# Patient Record
Sex: MALE | Race: BLACK OR AFRICAN AMERICAN | Employment: FULL TIME
[De-identification: names, ages, dates, MRNs, and addresses within clinical notes are randomized per-mention and may not be internally consistent; named-entity substitution may affect disease eponyms.]

---

## 2022-12-15 ENCOUNTER — NURSE TRIAGE (OUTPATIENT)
Dept: OTHER | Facility: CLINIC | Age: 47
End: 2022-12-15

## 2022-12-15 NOTE — TELEPHONE ENCOUNTER
Location of patient: [address] [facility] [address] [facility] [address] osorio from [place] at [facility] with [employer]; Patient with Red Flag Complaint requesting to establish care with [facility]. Subjective: Caller states \"has dizziness and ringing in ears. \"     Current Symptoms: Friday when woke up and stood up was feeling dizzy. Laid back down and when got up wasn't as bad. Ears have been ringing. Ears have also been popping. Still dizzy at times. Onset: 1 week ago; gradual, intermittent    Associated Symptoms: NA    Pain Severity:    Temperature: denies     What has been tried:     LMP: NA Pregnant: NA    Recommended disposition: See PCP within 3 Days    Care advice provided, patient verbalizes understanding; denies any other questions or concerns; instructed to call back for any new or worsening symptoms. Patient/Caller agrees with recommended disposition; writer provided warm transfer to [place] [facility] for appointment scheduling    Attention Provider: Thank you for allowing me to participate in the care of your patient. The patient was connected to triage in response to information provided to the [facility]. Please do not respond through this encounter as the response is not directed to a shared pool.     Reason for Disposition   MILD dizziness (e.g., walking normally) and has NOT been evaluated by physician for this (Exception: dizziness caused by heat exposure, sudden standing, or poor fluid intake)    Protocols used: Dizziness-ADULT-[place]

## 2022-12-16 ENCOUNTER — OFFICE VISIT (OUTPATIENT)
Dept: INTERNAL MEDICINE CLINIC | Age: 47
End: 2022-12-16
Payer: COMMERCIAL

## 2022-12-16 VITALS
BODY MASS INDEX: 31.78 KG/M2 | TEMPERATURE: 98.7 F | OXYGEN SATURATION: 96 % | WEIGHT: 222 LBS | SYSTOLIC BLOOD PRESSURE: 119 MMHG | HEIGHT: 70 IN | RESPIRATION RATE: 20 BRPM | HEART RATE: 95 BPM | DIASTOLIC BLOOD PRESSURE: 77 MMHG

## 2022-12-16 DIAGNOSIS — R42 VERTIGO: Primary | ICD-10-CM

## 2022-12-16 PROCEDURE — 99202 OFFICE O/P NEW SF 15 MIN: CPT | Performed by: NURSE PRACTITIONER

## 2022-12-16 RX ORDER — MECLIZINE HYDROCHLORIDE 25 MG/1
25 TABLET ORAL
Qty: 30 TABLET | Refills: 0 | Status: SHIPPED | OUTPATIENT
Start: 2022-12-16 | End: 2022-12-26

## 2022-12-16 NOTE — PROGRESS NOTES
Zeinab Plaza (: 1975) is a 49 y.o. male is here for evaluation of the following chief complaint(s): Dizziness (Ears popping - balance feels off)        Subjective/Objective:   Digna Ledesma was seen today for Dizziness (Ears popping - balance feels off)  PT states has had ears popping of an on for a year. Pt reports dizziness with room spinning for a week and increase ear fullness. Pt denies pain fever drainage from ears. PT denies cough runny nose or congestion. Review of Systems   Constitutional: Negative. HENT:  Positive for tinnitus. Ears popping. Eyes: Negative. Respiratory: Negative. Cardiovascular: Negative. Gastrointestinal: Negative. Genitourinary: Negative. Musculoskeletal: Negative. Skin: Negative. Neurological:  Positive for dizziness. Negative for tingling, tremors, sensory change, speech change, focal weakness, seizures, loss of consciousness, weakness and headaches. Endo/Heme/Allergies: Negative. Psychiatric/Behavioral: Negative. Physical Exam  Vitals reviewed. Constitutional:       General: He is not in acute distress. Appearance: Normal appearance. He is not ill-appearing. HENT:      Head: Normocephalic and atraumatic. Right Ear: Ear canal and external ear normal. No drainage, swelling or tenderness. No middle ear effusion. No mastoid tenderness. Left Ear: Ear canal and external ear normal. There is impacted cerumen. Ears:      Comments: Fluid behind ears drum. Nose: Nose normal.      Right Sinus: No maxillary sinus tenderness or frontal sinus tenderness. Left Sinus: No maxillary sinus tenderness or frontal sinus tenderness. Mouth/Throat:      Lips: Pink. Mouth: Mucous membranes are moist.      Pharynx: Oropharynx is clear. Eyes:      General: Lids are normal.      Extraocular Movements:      Right eye: No nystagmus. Left eye: No nystagmus.       Conjunctiva/sclera: Conjunctivae normal.   Neck: Trachea: Trachea and phonation normal.   Cardiovascular:      Rate and Rhythm: Normal rate and regular rhythm. Pulses: Normal pulses. Radial pulses are 2+ on the right side. Heart sounds: Normal heart sounds, S1 normal and S2 normal.   Pulmonary:      Effort: Pulmonary effort is normal.      Breath sounds: Normal breath sounds and air entry. Abdominal:      Palpations: Abdomen is soft. Musculoskeletal:         General: Normal range of motion. Cervical back: Full passive range of motion without pain and normal range of motion. No spinous process tenderness or muscular tenderness. Right lower leg: No edema. Left lower leg: No edema. Lymphadenopathy:      Cervical: No cervical adenopathy. Skin:     General: Skin is warm and dry. Capillary Refill: Capillary refill takes less than 2 seconds. Neurological:      Mental Status: He is alert. Sensory: Sensation is intact. Motor: Motor function is intact. Coordination: Coordination is intact. Gait: Gait is intact. Deep Tendon Reflexes:      Reflex Scores:       Bicep reflexes are 2+ on the right side and 2+ on the left side. Patellar reflexes are 3+ on the right side and 2+ on the left side. Comments: No neurological abnormality noted. Psychiatric:         Mood and Affect: Mood normal.         Behavior: Behavior normal.         Thought Content: Thought content normal.         Judgment: Judgment normal.        /77 (BP 1 Location: Right arm, BP Patient Position: Sitting, BP Cuff Size: Large adult)   Pulse 95   Temp 98.7 °F (37.1 °C) (Temporal)   Resp 20   Ht 5' 10\" (1.778 m)   Wt 222 lb (100.7 kg)   SpO2 96%   BMI 31.85 kg/m²      No visits with results within 6 Month(s) from this visit.    Latest known visit with results is:   Office Visit on 05/06/2011   Component Date Value Ref Range Status    Testosterone 05/06/2011 442  249 - 836 ng/dL Final    Free testosterone (Direct) 05/06/2011 12.7  8.7 - 25.1 pg/mL Final    Comment: Performed At: [address]  [id number]                           Cheryle Garret MD                           [phone]         No past surgical history on file. Past Medical History:   Diagnosis Date    Pure hypercholesterolemia     Rectal bleed         Current Outpatient Medications   Medication Instructions    meclizine (ANTIVERT) 25 mg, Oral, 3 TIMES DAILY AS NEEDED        Assessment/Plan:     The above diagnosis is a new problem. We discussed expected course, resolution, and complications of diagnosis in detail. I advised him to call back or return to office if symptoms worsen/change/persist.        ICD-10-CM ICD-9-CM    1. Vertigo  R42 780.4 REFERRAL TO ENT-OTOLARYNGOLOGY         Return if symptoms worsen or fail to improve. An electronic signature was used to authenticate this note.   -- Rosi Hooper NP

## 2022-12-16 NOTE — PATIENT INSTRUCTIONS
Please take medication prescribed and if no better make appt with the Ear Nose and Throat specialist.     It was a pleasure taking care of you today.      María Crawford NP

## 2023-08-31 ENCOUNTER — CLINICAL DOCUMENTATION (OUTPATIENT)
Facility: CLINIC | Age: 48
End: 2023-08-31

## 2023-08-31 SDOH — ECONOMIC STABILITY: FOOD INSECURITY: WITHIN THE PAST 12 MONTHS, THE FOOD YOU BOUGHT JUST DIDN'T LAST AND YOU DIDN'T HAVE MONEY TO GET MORE.: NEVER TRUE

## 2023-08-31 SDOH — ECONOMIC STABILITY: HOUSING INSECURITY
IN THE LAST 12 MONTHS, WAS THERE A TIME WHEN YOU DID NOT HAVE A STEADY PLACE TO SLEEP OR SLEPT IN A SHELTER (INCLUDING NOW)?: NO

## 2023-08-31 SDOH — ECONOMIC STABILITY: INCOME INSECURITY: HOW HARD IS IT FOR YOU TO PAY FOR THE VERY BASICS LIKE FOOD, HOUSING, MEDICAL CARE, AND HEATING?: NOT HARD AT ALL

## 2023-08-31 SDOH — ECONOMIC STABILITY: FOOD INSECURITY: WITHIN THE PAST 12 MONTHS, YOU WORRIED THAT YOUR FOOD WOULD RUN OUT BEFORE YOU GOT MONEY TO BUY MORE.: NEVER TRUE

## 2023-08-31 SDOH — ECONOMIC STABILITY: TRANSPORTATION INSECURITY
IN THE PAST 12 MONTHS, HAS LACK OF TRANSPORTATION KEPT YOU FROM MEETINGS, WORK, OR FROM GETTING THINGS NEEDED FOR DAILY LIVING?: NO

## 2023-08-31 NOTE — PROGRESS NOTES
Pt sister, Tobias Crews called to make an appt, saying that he is 3 hours away and needs it for tomorrow because he needs to e evaluated because he has not been here for almost a year. I told her I was sorry but tomorrow was booked and that Dr Kira Castillo was out on fridays, could I look at another day for her to schedule him. She got upset saying that he needs an appt either today or tomorrow. I told her I could not do tomorrow and apologized. She asked if there was anything today, I told her at the time I had a 1 or 1:30. She started yelling that he was 3 hours away so he would not be able to get here at those times. I apologized once again and said that there wasn't anything I could do if there is no availability. She said \"Well if I have to, I will give him my appt today. \" And hung up the phone

## 2023-09-01 ENCOUNTER — OFFICE VISIT (OUTPATIENT)
Facility: CLINIC | Age: 48
End: 2023-09-01
Payer: COMMERCIAL

## 2023-09-01 VITALS
HEART RATE: 89 BPM | WEIGHT: 217 LBS | RESPIRATION RATE: 16 BRPM | TEMPERATURE: 98.6 F | BODY MASS INDEX: 32.14 KG/M2 | OXYGEN SATURATION: 94 % | DIASTOLIC BLOOD PRESSURE: 89 MMHG | HEIGHT: 69 IN | SYSTOLIC BLOOD PRESSURE: 134 MMHG

## 2023-09-01 DIAGNOSIS — D22.9 ATYPICAL MOLE: ICD-10-CM

## 2023-09-01 DIAGNOSIS — K21.9 GASTROESOPHAGEAL REFLUX DISEASE, UNSPECIFIED WHETHER ESOPHAGITIS PRESENT: Primary | ICD-10-CM

## 2023-09-01 PROCEDURE — 99213 OFFICE O/P EST LOW 20 MIN: CPT | Performed by: NURSE PRACTITIONER

## 2023-09-01 RX ORDER — OMEPRAZOLE 40 MG/1
40 CAPSULE, DELAYED RELEASE ORAL
Qty: 30 CAPSULE | Refills: 5 | Status: SHIPPED | OUTPATIENT
Start: 2023-09-01

## 2023-09-01 ASSESSMENT — PATIENT HEALTH QUESTIONNAIRE - PHQ9
SUM OF ALL RESPONSES TO PHQ9 QUESTIONS 1 & 2: 0
SUM OF ALL RESPONSES TO PHQ QUESTIONS 1-9: 0
SUM OF ALL RESPONSES TO PHQ QUESTIONS 1-9: 0
1. LITTLE INTEREST OR PLEASURE IN DOING THINGS: 0
SUM OF ALL RESPONSES TO PHQ QUESTIONS 1-9: 0
2. FEELING DOWN, DEPRESSED OR HOPELESS: 0
SUM OF ALL RESPONSES TO PHQ QUESTIONS 1-9: 0

## 2023-09-01 NOTE — PATIENT INSTRUCTIONS
Take the increased dose of Prilosec. Referral for Gastroenterology placed. Referral to derm placed.      ELVIRA Palafox NP\

## 2023-09-01 NOTE — PROGRESS NOTES
Chief Complaint   Patient presents with    Heartburn     Heartburn x 3 mths    Mass     Upper mid back scaly place getting bigger    Palpitations     Palpitations when pt is going up the stairs or walking fast off and on   Pt stated feels a flutter upper mid chest sometimes          Patient has not been out of the country in (14 months), NO diarrhea, NO cough, NO chest conjestion, NO temp. Pt has not been around anyone with these symptoms. Health Maintenance reviewed. I have reviewed the patient's medical history in detail and updated the computerized patient record. 1. \"Have you been to the ER, urgent care clinic since your last visit? No Hospitalized since your last visit? \" no    2. \"Have you seen or consulted any other health care providers outside of the [address] [place] [address] since your last visit? \" no     3. For patients aged 43-73: Has the patient had a colonoscopy / FIT/ Cologuard?  no

## 2023-09-10 ASSESSMENT — ENCOUNTER SYMPTOMS
RECTAL PAIN: 0
ANAL BLEEDING: 0
NAUSEA: 0
ABDOMINAL DISTENTION: 0
EYES NEGATIVE: 1
BLOOD IN STOOL: 0
CONSTIPATION: 0
ABDOMINAL PAIN: 1
VOMITING: 0
DIARRHEA: 0
ALLERGIC/IMMUNOLOGIC NEGATIVE: 1
RESPIRATORY NEGATIVE: 1

## 2023-09-10 NOTE — PROGRESS NOTES
Amina Lance (: 1975) is a 51 y.o. male is here for evaluation of the following chief complaint(s): Heartburn (Heartburn x 3 mths), Mass (Upper mid back scaly place getting bigger), and Palpitations (Palpitations when pt is going up the stairs or walking fast off and on /Pt stated feels a flutter upper mid chest sometimes /)        Subjective/Objective:   Zonia Sinha was seen today for Heartburn (Heartburn x 3 mths), Mass (Upper mid back scaly place getting bigger), and Palpitations (Palpitations when pt is going up the stairs or walking fast off and on /Pt stated feels a flutter upper mid chest sometimes /)  Pt states having heartburn for the past 3 months and worse last night. Pt denies f/n/v/d/c. Pt states increased belching. Pt takes occasional omeprazole of unknown mg which does help. Pt does report some palpations and occasional fluttering. Pt also concerned about a mole on back that has become flaky. Referral to GI and Derm placed. Pt does not want to establish care. Review of Systems   Constitutional: Negative. HENT: Negative. Eyes: Negative. Respiratory: Negative. Cardiovascular:  Positive for palpitations. Negative for chest pain and leg swelling. Gastrointestinal:  Positive for abdominal pain. Negative for abdominal distention, anal bleeding, blood in stool, constipation, diarrhea, nausea, rectal pain and vomiting. Endocrine: Negative. Genitourinary: Negative. Musculoskeletal: Negative. Skin: Negative. Allergic/Immunologic: Negative. Neurological: Negative. Hematological: Negative. Psychiatric/Behavioral: Negative. Physical Exam  Vitals reviewed. Constitutional:       General: He is not in acute distress. Appearance: Normal appearance. He is not ill-appearing, toxic-appearing or diaphoretic. HENT:      Head: Normocephalic and atraumatic.       Right Ear: External ear normal.      Left Ear: External ear normal.      Nose: Nose normal.